# Patient Record
(demographics unavailable — no encounter records)

---

## 2024-12-18 NOTE — REASON FOR VISIT
[Follow-Up] : a follow-up visit for [Hematuria] : hematuria [Mother] : mother [Medical Records] : medical records

## 2024-12-20 NOTE — PHYSICAL EXAM
[Well Developed] : well developed [Well Nourished] : well nourished [Normal] : soft; non- distended; non-tender; no hepatosplenomegaly or masses [de-identified] : normocephalic atraumatic no conjunctival injection intact extraocular movements, sclera not icteric moist mucous membranes [de-identified] : no edema [de-identified] : no focal deficits

## 2024-12-20 NOTE — CONSULT LETTER
[Consult Letter:] : I had the pleasure of evaluating your patient, [unfilled]. [Consult Closing:] : Thank you very much for allowing me to participate in the care of this patient.  If you have any questions, please do not hesitate to contact me. [Sincerely,] : Sincerely, [FreeTextEntry3] : Waleska Tee MD Pediatric Nephrologist [facility] [phone]

## 2024-12-20 NOTE — PHYSICAL EXAM
[Well Developed] : well developed [Well Nourished] : well nourished [Normal] : soft; non- distended; non-tender; no hepatosplenomegaly or masses [de-identified] : normocephalic atraumatic no conjunctival injection intact extraocular movements, sclera not icteric moist mucous membranes [de-identified] : no edema [de-identified] : no focal deficits

## 2024-12-20 NOTE — PHYSICAL EXAM
[Well Developed] : well developed [Well Nourished] : well nourished [Normal] : soft; non- distended; non-tender; no hepatosplenomegaly or masses [de-identified] : normocephalic atraumatic no conjunctival injection intact extraocular movements, sclera not icteric moist mucous membranes [de-identified] : no edema [de-identified] : no focal deficits

## 2024-12-20 NOTE — CONSULT LETTER
[Consult Letter:] : I had the pleasure of evaluating your patient, [unfilled]. [Consult Closing:] : Thank you very much for allowing me to participate in the care of this patient.  If you have any questions, please do not hesitate to contact me. [Sincerely,] : Sincerely, [FreeTextEntry3] : Waleska Tee MD Pediatric Nephrologist [facility] [phone]

## 2024-12-20 NOTE — CONSULT LETTER
[Consult Letter:] : I had the pleasure of evaluating your patient, [unfilled]. [Consult Closing:] : Thank you very much for allowing me to participate in the care of this patient.  If you have any questions, please do not hesitate to contact me. [Sincerely,] : Sincerely, [FreeTextEntry3] : Waleska Tee MD Pediatric Nephrologist [facility] [phone]